# Patient Record
Sex: FEMALE | Race: BLACK OR AFRICAN AMERICAN | Employment: FULL TIME | URBAN - METROPOLITAN AREA
[De-identification: names, ages, dates, MRNs, and addresses within clinical notes are randomized per-mention and may not be internally consistent; named-entity substitution may affect disease eponyms.]

---

## 2018-07-19 ENCOUNTER — OFFICE VISIT (OUTPATIENT)
Dept: FAMILY MEDICINE CLINIC | Age: 28
End: 2018-07-19

## 2018-07-19 VITALS
DIASTOLIC BLOOD PRESSURE: 75 MMHG | HEART RATE: 87 BPM | WEIGHT: 221 LBS | BODY MASS INDEX: 40.67 KG/M2 | HEIGHT: 62 IN | TEMPERATURE: 98 F | RESPIRATION RATE: 20 BRPM | SYSTOLIC BLOOD PRESSURE: 131 MMHG

## 2018-07-19 DIAGNOSIS — E66.01 OBESITY, MORBID (HCC): ICD-10-CM

## 2018-07-19 DIAGNOSIS — R06.09 DYSPNEA ON EXERTION: ICD-10-CM

## 2018-07-19 DIAGNOSIS — Z11.3 ROUTINE SCREENING FOR STI (SEXUALLY TRANSMITTED INFECTION): ICD-10-CM

## 2018-07-19 DIAGNOSIS — Z76.89 ENCOUNTER TO ESTABLISH CARE: Primary | ICD-10-CM

## 2018-07-19 DIAGNOSIS — F41.8 SITUATIONAL ANXIETY: ICD-10-CM

## 2018-07-19 NOTE — PROGRESS NOTES
Family Medicine Initial Office Visit  Patient: Niraj Staley  1990, 24 y.o., female  Encounter Date: 7/19/2018    ASSESSMENT & PLAN    ICD-10-CM ICD-9-CM    1. Encounter to establish care Z76.89 V65.8    2. Routine screening for STI (sexually transmitted infection) Z11.3 V74.5     will check routine screening, discussed with patient   3. Obesity, morbid ([address] E66.01 278.01    4. Dyspnea on exertion R06.09 786.09    5. Situational anxiety F41.8 300.09      Orders Placed This Encounter    TRINESSA, 28, 0.18/0.215/0.25 mg-35 mcg (28) tab     Refill:  0   Obesity, morbid ([address]  Encouraged on diet, exercise and healthy lifestyle. Recommend patient set up her phone to help her track her steps. Can use HEALTH johana on Apple phones. Can also download apps to track food intake like My Fitness Pal or the like and can use apps to track other fitness such as RunTracker or similar. Encouraged starting a fun exercise habit like dance class or spinning which I believe the patient would enjoy, discussed accountability tools. Did speak to patient about higher risk level for adult onset conditions due to weight, she is aware and looking to make positive changes. Dyspnea on exertion  Does have PVC with exam today--EKG in office, consider echocardiogram. GUPTA could certainly be from deconditioning or anxiety but will rule out cardiac causes. Will also check labs today to establish a baseline    Situational anxiety  Patient encouraged to continue to follow with her therapist. No desire for medication at this time. Exercise can release \"feel good hormones\" called endorphins that can help with mood. Discussed that grief is an ongoing process to work through and can precipitate anxiety as well, which may complicate her situation. Sleep hygiene, adequate support and asking for help when needed. If symptoms change or worsen should call for follow up appt.  Again, we will check labs to ensure no underlying endocrinologic or metabolic abnormality which might be contributing. STI Testing  Routine, as per orders    CHIEF COMPLAINT  Chief Complaint   Patient presents with    Establish Care     was told she has a heart murmur        SUBJECTIVE  Papa Rodríguez is a 24 y.o. female presenting today for establishing care. She had a virus in the past and [facility] and she was told she had an irregular heart beat with a heart murmur. Sometimes gets SOB when she is increasing her activity. She also reports intermittent exertional chest pain with exertion. Pt reports increased stress an anxiety which started when she was in college, she finds that her father passing also causing some stress. He passed on April 1--she had been going to see a counselor for about a year now--that helps her tremendously. Exercise is a work in progress, she does find comfort in eating. Patient works as  at [employer]. Patient lives in a house with mother and brother  Patient was last seen by primary care 4 years ago. Sees gynecology at [facility]--does not remember when last pap was  Patient last saw a dentist a few years ago  Patient last had eye exam within the last year, wears glasses and contacts. Review of Systems   Constitutional: Negative for chills and fever. HENT: Negative. Eyes: Negative for visual disturbance. Respiratory: Negative for cough and shortness of breath. Cardiovascular: Negative for chest pain and leg swelling. Gastrointestinal: Negative for constipation, diarrhea, nausea and vomiting. Endocrine: Negative for polydipsia, polyphagia and polyuria. Genitourinary: Negative for difficulty urinating. Musculoskeletal: Negative for arthralgias and myalgias. Skin: Negative for pallor and rash. Neurological: Negative for seizures, syncope and headaches.    Psychiatric/Behavioral:        At Baseline, stable   All other systems reviewed and are negative. OBJECTIVE  Visit Vitals    /75    Pulse 87    Temp 98 °F (36.7 °C) (Oral)    Resp 20    Ht 5' 1.5\" (1.562 m)    Wt 221 lb (100.2 kg)    LMP 07/12/2018    BMI 41.08 kg/m2       Physical Exam   Constitutional: She is oriented to person, place, and time. She appears well-developed and well-nourished. No distress. NAD, Nontoxic, Appears Stated Age, morbidly obese   HENT:   Head: Normocephalic and atraumatic. Mouth/Throat: Oropharynx is clear and moist.   Eyes: Conjunctivae and EOM are normal. Right eye exhibits no discharge. Left eye exhibits no discharge. No scleral icterus. Neck: Neck supple. No thyromegaly present. Cardiovascular: Normal rate, regular rhythm, normal heart sounds and intact distal pulses. Exam reveals no gallop and no friction rub. No murmur heard. Regular underlying heart rhythm with pvcs, no murmur today   Pulmonary/Chest: Effort normal and breath sounds normal. No stridor. No respiratory distress. She has no wheezes. She has no rales. Abdominal: Soft. Bowel sounds are normal. She exhibits no distension. There is no tenderness. There is no rebound and no guarding. Musculoskeletal: She exhibits no edema or tenderness. Neurological: She is alert and oriented to person, place, and time. No cranial nerve deficit. She exhibits normal muscle tone. Grossly intact CN   Skin: Skin is warm and dry. No rash noted. She is not diaphoretic. Psychiatric: She has a normal mood and affect. Her behavior is normal.   Nursing note and vitals reviewed. No results found for any visits on 07/19/18. EKG: normal EKG, normal sinus rhythm, no previous for comparison. See scanned EKG in chart, performed in office today    HISTORICAL  Reviewed and updated today, and as noted below:    History reviewed. No pertinent past medical history. History reviewed. No pertinent surgical history.   Family History   Problem Relation Age of Onset    Hypertension Mother    [facility] Diabetes Father      History   Smoking Status    Never Smoker   Smokeless Tobacco    Never Used     Social History     Social History    Marital status: SINGLE     Spouse name: N/A    Number of children: N/A    Years of education: N/A     Social History Main Topics    Smoking status: Never Smoker    Smokeless tobacco: Never Used    Alcohol use No    Drug use: No    Sexual activity: Yes     Partners: Male     Other Topics Concern    None     Social History Narrative     No Known Allergies    No visits with results within 3 Month(s) from this visit. Latest known visit with results is:    Office Visit on 02/28/2014   Component Date Value Ref Range Status    Color (UA POC) 02/28/2014 Yellow   Final    Clarity (UA POC) 02/28/2014 Clear   Final    Glucose (UA POC) 02/28/2014 Negative  Negative Final    Bilirubin (UA POC) 02/28/2014 Negative  Negative Final    Ketones (UA POC) 02/28/2014 Negative  Negative Final    Specific gravity (UA POC) 02/28/2014 1.030  1.001 - 1.035 Final    Blood (UA POC) 02/28/2014   Negative Final    large     pH (UA POC) 02/28/2014 5.0  4.6 - 8.0 Final    Protein (UA POC) 02/28/2014 Trace  Negative mg/dL Final    Urobilinogen (UA POC) 02/28/2014 0.2 mg/dL  0.2 - 1 Final    Nitrites (UA POC) 02/28/2014 Positive  Negative Final    Leukocyte esterase (UA POC) 02/28/2014   Negative Final    small    Urine Culture, Routine 02/28/2014    Final                    Value:Escherichia coli                          Identified by an automated biochemical system. Greater than 100,000 colony forming units per mL    Urine Culture, Routine 02/28/2014    Final                    Value:Mixed urogenital suellen                          Less than 10,000 colonies/mL         Apryl Garvin MD  [facility]  07/19/18 9:17 AM    Portions of this note may have been populated using smart dictation software and may have \"sounds-like\" errors present.

## 2018-07-19 NOTE — ASSESSMENT & PLAN NOTE
Does have PVC with exam today--EKG in office, consider echocardiogram. GUPTA could certainly be from deconditioning or anxiety but will rule out cardiac causes.  Will also check labs today to establish a baseline

## 2018-07-19 NOTE — MR AVS SNAPSHOT
[address] 
[phone] Patient: Humaira Rico MRN: [id number] :1990 Visit Information Date & Time Provider Department Dept. Phone Encounter #  
 2018  [address] [id number] Upcoming Health Maintenance Date Due  
 HPV Age 9Y-34Y (1 of 3 - Female 3 Dose Series) 2003 PAP AKA CERVICAL CYTOLOGY 2013 Influenza Age 5 to Adult 2018 DTaP/Tdap/Td series (7 - Td) 2023 Allergies as of 2018  Review Complete On: 2018 By: Kayden Varghese No Known Allergies Current Immunizations  Reviewed on 2013 Name Date DTaP 1996, 10/12/1993, 3/25/1993, 1992, 1992 Hep B Vaccine 1999, 1997 Hep B Vaccine (Adult) 2013  8:54 AM  
 Hib 10/12/1993 MMR 1996, 10/12/1993 Meningococcal (MCV4P) Vaccine 2013 10:00 AM  
 Poliovirus vaccine 1997, 1996, 3/25/1993, 1992 Tdap 2013  9:58 AM  
  
 Not reviewed this visit You Were Diagnosed With   
  
 Codes Comments Encounter to establish care    -  Primary ICD-10-CM: Z76.89 
ICD-9-CM: V65.8 Routine screening for STI (sexually transmitted infection)     ICD-10-CM: Z11.3 ICD-9-CM: V74.5 will check routine screening, discussed with patient Obesity, morbid ([address]     ICD-10-CM: E66.01 
ICD-9-CM: 278.01 Dyspnea on exertion     ICD-10-CM: R06.09 
ICD-9-CM: 786.09 Situational anxiety     ICD-10-CM: F41.8 ICD-9-CM: 300.09 Vitals BP Pulse Temp Resp Height(growth percentile) Weight(growth percentile) 131/75 87 98 °F (36.7 °C) (Oral) 20 5' 1.5\" (1.562 m) 221 lb (100.2 kg) LMP BMI OB Status Smoking Status 2018 41.08 kg/m2 Having regular periods Never Smoker Vitals History BMI and BSA Data Body Mass Index Body Surface Area  41.08 kg/m 2 2.09 m 2  
  
 Preferred Pharmacy Pharmacy Name Phone [id number] [address] [phone] Your Updated Medication List  
  
   
This list is accurate as of 7/19/18 10:12 AM.  Always use your most recent med list.  
  
  
  
  
 BENADRYL ALLERGY PO Take  by mouth as needed. TRINESSA (28) 0.18/0.215/0.25 mg-35 mcg (28) Tab Generic drug:  norgestimate-ethinyl estradiol We Performed the Following AMB POC EKG ROUTINE W/ 12 LEADS, INTER & REP [61385 CPT(R)] CBC W/O DIFF [69873 CPT(R)] CHLAMYDIA/GC PCR [00378 CPT(R)] HIV 1/2 AG/AB, 4TH GENERATION,W RFLX CONFIRM C4657746 CPT(R)] LIPID PANEL [66517 CPT(R)] METABOLIC PANEL, COMPREHENSIVE [89018 CPT(R)] RPR W/REFLEX TITER AND CONFIRMATION [[id number] Custom] T4, FREE [id number] CPT(R)] TSH 3RD GENERATION [92693 CPT(R)] Introducing [facility]! Tate Del Angel introduces [facility] patient portal. Now you can access parts of your medical record, email your doctor's office, and request medication refills online. 1. In your internet browser, go to [email or web address] 2. Click on the First Time User? Click Here link in the Sign In box. You will see the New Member Sign Up page. 3. Enter your [facility] Access Code exactly as it appears below. You will not need to use this code after youve completed the sign-up process. If you do not sign up before the expiration date, you must request a new code. · [facility] Access Code: [email or web address] Expires: 10/17/2018  8:48 AM 
 
4. Enter the last four digits of your Social Security Number (xxxx) and Date of Birth (mm/dd/yyyy) as indicated and click Submit. You will be taken to the next sign-up page. 5. Create a Piazzat ID. This will be your [facility] login ID and cannot be changed, so think of one that is secure and easy to remember. 6. Create a [facility] password. You can change your password at any time. 7. Enter your Password Reset Question and Answer. This can be used at a later time if you forget your password. 8. Enter your e-mail address. You will receive e-mail notification when new information is available in [address] 9. Click Sign Up. You can now view and download portions of your medical record. 10. Click the Download Summary menu link to download a portable copy of your medical information. If you have questions, please visit the Frequently Asked Questions section of the Datezr website. Remember, Datezr is NOT to be used for urgent needs. For medical emergencies, dial 911. Now available from your iPhone and Android! Please provide this summary of care documentation to your next provider. Your primary care clinician is listed as Catalina Camilo. If you have any questions after today's visit, please call [phone].

## 2018-07-19 NOTE — ASSESSMENT & PLAN NOTE
Patient encouraged to continue to follow with her therapist. No desire for medication at this time. Exercise can release \"feel good hormones\" called endorphins that can help with mood. Discussed that grief is an ongoing process to work through and can precipitate anxiety as well, which may complicate her situation. Sleep hygiene, adequate support and asking for help when needed. If symptoms change or worsen should call for follow up appt. Again, we will check labs to ensure no underlying endocrinologic or metabolic abnormality which might be contributing.

## 2018-07-19 NOTE — ASSESSMENT & PLAN NOTE
Encouraged on diet, exercise and healthy lifestyle. Recommend patient set up her phone to help her track her steps. Can use HEALTH johana on Apple phones. Can also download apps to track food intake like My Fitness Pal or the like and can use apps to track other fitness such as RunTracker or similar. Encouraged starting a fun exercise habit like dance class or spinning which I believe the patient would enjoy, discussed accountability tools. Did speak to patient about higher risk level for adult onset conditions due to weight, she is aware and looking to make positive changes.

## 2018-07-19 NOTE — PATIENT INSTRUCTIONS
Obesity, morbid ([facility] [address]  Encouraged on diet, exercise and healthy lifestyle. Recommend patient set up her phone to help her track her steps. Can use HEALTH johana on Apple phones. Can also download apps to track food intake like My Fitness Pal or the like and can use apps to track other fitness such as RunTracker or similar. Encouraged starting a fun exercise habit like dance class or spinning which I believe the patient would enjoy, discussed accountability tools. Did speak to patient about higher risk level for adult onset conditions due to weight, she is aware and looking to make positive changes. Dyspnea on exertion  Does have PVC with exam today--EKG in office, consider echocardiogram. GUPTA could certainly be from deconditioning or anxiety but will rule out cardiac causes. Will also check labs today to establish a baseline    Situational anxiety  Patient encouraged to continue to follow with her therapist. No desire for medication at this time. Exercise can release \"feel good hormones\" called endorphins that can help with mood. Discussed that grief is an ongoing process to work through and can precipitate anxiety as well, which may complicate her situation. Sleep hygiene, adequate support and asking for help when needed. If symptoms change or worsen should call for follow up appt. Again, we will check labs to ensure no underlying endocrinologic or metabolic abnormality which might be contributing.

## 2018-07-20 ENCOUNTER — TELEPHONE (OUTPATIENT)
Dept: FAMILY MEDICINE CLINIC | Age: 28
End: 2018-07-20

## 2018-07-20 NOTE — TELEPHONE ENCOUNTER
----- Message from Brittany Perkins MD sent at 7/19/2018 10:50 AM EDT -----  Regarding: pt records req  Pt has been seeing [address] [facility] on [place], please can we request record of PAP? [facility] [place], was told about heart murmur, please can we request record? Thank you!   CKD

## 2018-07-20 NOTE — TELEPHONE ENCOUNTER
Request for PAP results have been faxed to [facility] at [phone]. Request for heart murmur records have been faxed to [facility] at [phone].

## 2020-11-06 ENCOUNTER — OFFICE VISIT (OUTPATIENT)
Dept: FAMILY MEDICINE CLINIC | Age: 30
End: 2020-11-06
Payer: COMMERCIAL

## 2020-11-06 VITALS
DIASTOLIC BLOOD PRESSURE: 79 MMHG | WEIGHT: 189 LBS | HEART RATE: 81 BPM | SYSTOLIC BLOOD PRESSURE: 113 MMHG | RESPIRATION RATE: 20 BRPM | BODY MASS INDEX: 34.78 KG/M2 | OXYGEN SATURATION: 97 % | TEMPERATURE: 98.2 F | HEIGHT: 62 IN

## 2020-11-06 DIAGNOSIS — K62.89 RECTAL PAIN: Primary | ICD-10-CM

## 2020-11-06 PROCEDURE — 99213 OFFICE O/P EST LOW 20 MIN: CPT | Performed by: FAMILY MEDICINE

## 2020-11-06 PROCEDURE — 82274 ASSAY TEST FOR BLOOD FECAL: CPT | Performed by: FAMILY MEDICINE

## 2020-11-06 NOTE — PROGRESS NOTES
Chief Complaint   Patient presents with    Hemorrhoids     pt states she has had for years- OTC medications not helping

## 2020-11-06 NOTE — PROGRESS NOTES
Family Medicine Acute Visit Progress Note  Patient: Andreina Melton  1990, 31 y.o., female  Encounter Date: 11/6/2020    ASSESSMENT & PLAN    ICD-10-CM ICD-9-CM    1. Rectal pain  K62.89 569.42 REFERRAL TO GASTROENTEROLOGY      AMB POC BLOOD OCCULT QUAL FECAL HEMGLBN 1-3       Orders Placed This Encounter    Aminah Sung [facility]     Referral Priority:   Routine     Referral Type:   Consultation     Referral Reason:   Specialty Services Required     Referral Location:   [facility]     Referred to Provider:   Carmel Hodgson MD     Number of Visits Requested:   1    AMB POC BLOOD OCCULT QUAL FECAL HEMGLBN 1-3       Patient Instructions   The patient appears to have a very small external hemorrhoid at 11:00 that is not thrombosed or inflamed and she does have some rectal pain when I do the internal exam however there is no palpable mass and the FOBT was negative.   I would recommend she continue the hydrocortisone suppository she is using and follow-up with gastroenterology if her symptoms continue to worsen or do not improve with the course of hydrocortisone suppositories that she is already using  I discussed with her the goal of 30 g of fiber daily and potentially logging her fiber so that she can make sure that she stays on top of this as well as drinking plenty of water      CHIEF COMPLAINT  Chief Complaint   Patient presents with    Hemorrhoids     pt states she has had for years- OTC medications not helping        SUBJECTIVE  Andreina Melton is a 31 y.o. female presenting today for hemorrhoids  In July she had a large external hemorrhoid  Now having what she thinks is internal hemorrhoids that are somewhat becoming more painful and she's wanting to get it better  Right now trying to eat more fiber  Trying to drink more water  Trying to exercise  Trying to use the bathroom when she has to, \"not holding it\"  Yes to bleeding, bright red it is in the toilet bowl and on the tb, there are some streaks on the bowel movement as well    [place] stool scale is around 6 she reports, it \"Shreds\" when she flushes the toilet    Has lost 14 lbs since her last visit here  Is a pescatarian now x 1 year  Review of Systems  A 12 point review of systems was negative except as noted here or in the HPI. OBJECTIVE  Visit Vitals  /79   Pulse 81   Temp 98.2 °F (36.8 °C) (Temporal)   Resp 20   Ht 5' 1.5\" (1.562 m)   Wt 189 lb (85.7 kg)   SpO2 97%   BMI 35.13 kg/m²       Physical Exam  Constitutional:       General: She is not in acute distress. Appearance: Normal appearance. She is not ill-appearing, toxic-appearing or diaphoretic. HENT:      Head: Normocephalic and atraumatic. Right Ear: External ear normal.      Left Ear: External ear normal.   Eyes:      General: No scleral icterus. Right eye: No discharge. Left eye: No discharge. Comments: eom grossly intact   Neck:      Comments: No visible neck masses , ROM appears normal from visual inspection  Pulmonary:      Effort: Pulmonary effort is normal. No respiratory distress. Genitourinary:     Comments: Small external hemorrhoid at 11:00 without thrombosis and without inflammation, not exquisitely tender on physical exam however internal exam was tender, sphincter tone was good and normal and there were no palpable masses, stool in the vault was brown and FOBT was negative  Skin:     Comments: Visible skin is without jaundice, bruising, lesion, pallor, erythema or rash except as otherwise noted   Neurological:      General: No focal deficit present. Mental Status: She is alert and oriented to person, place, and time. Psychiatric:         Mood and Affect: Mood normal.         Behavior: Behavior normal.         Thought Content:  Thought content normal.         Judgment: Judgment normal.         Results for orders placed or performed in visit on 11/06/20   AMB POC BLOOD OCCULT QUAL FECAL HEMGLBN 1-3    Narrative    Neg, control was noted and positive as normal       HISTORICAL  PMH, PSH, FHX, SOCHX, ALLERGIES and MEDS were reviewed and updated today. Dell David MD  [facility]  11/06/20 11:14 AM    Portions of this note may have been populated using smart dictation software and may have \"sounds-like\" errors present. Pt was counseled on risks, benefits and alternatives of treatment options. All questions were asked and answered and the patient was agreeable with the treatment plan as outlined.

## 2020-11-06 NOTE — PATIENT INSTRUCTIONS
The patient appears to have a very small external hemorrhoid at 11:00 that is not thrombosed or inflamed and she does have some rectal pain when I do the internal exam however there is no palpable mass and the FOBT was negative.   I would recommend she continue the hydrocortisone suppository she is using and follow-up with gastroenterology if her symptoms continue to worsen or do not improve with the course of hydrocortisone suppositories that she is already using  I discussed with her the goal of 30 g of fiber daily and potentially logging her fiber so that she can make sure that she stays on top of this as well as drinking plenty of water

## 2021-04-21 LAB — SARS-COV-2, NAA: NOT DETECTED

## 2021-09-13 LAB — SARS-COV-2: NOT DETECTED

## 2022-03-16 LAB — SARS-COV-2, NAA: NOT DETECTED

## 2022-03-19 PROBLEM — E66.01 OBESITY, MORBID (HCC): Status: ACTIVE | Noted: 2018-07-19

## 2022-03-19 PROBLEM — F41.8 SITUATIONAL ANXIETY: Status: ACTIVE | Noted: 2018-07-19

## 2022-03-19 PROBLEM — R06.09 DYSPNEA ON EXERTION: Status: ACTIVE | Noted: 2018-07-19

## 2022-09-28 ENCOUNTER — APPOINTMENT (OUTPATIENT)
Dept: GENERAL RADIOLOGY | Age: 32
End: 2022-09-28
Attending: EMERGENCY MEDICINE
Payer: COMMERCIAL

## 2022-09-28 ENCOUNTER — HOSPITAL ENCOUNTER (EMERGENCY)
Age: 32
Discharge: HOME | End: 2022-09-28
Attending: EMERGENCY MEDICINE
Payer: COMMERCIAL

## 2022-09-28 VITALS
SYSTOLIC BLOOD PRESSURE: 126 MMHG | HEART RATE: 65 BPM | RESPIRATION RATE: 16 BRPM | WEIGHT: 221 LBS | DIASTOLIC BLOOD PRESSURE: 79 MMHG | OXYGEN SATURATION: 98 % | BODY MASS INDEX: 43.39 KG/M2 | HEIGHT: 60 IN | TEMPERATURE: 98.7 F

## 2022-09-28 DIAGNOSIS — R07.9 ACUTE CHEST PAIN: Primary | ICD-10-CM

## 2022-09-28 LAB
ALBUMIN SERPL-MCNC: 3.5 G/DL (ref 3.5–5)
ALBUMIN/GLOB SERPL: 0.8 {RATIO} (ref 1.1–2.2)
ALP SERPL-CCNC: 74 U/L (ref 45–117)
ALT SERPL-CCNC: 20 U/L (ref 12–78)
ANION GAP SERPL CALC-SCNC: 4 MMOL/L (ref 5–15)
AST SERPL-CCNC: 14 U/L (ref 15–37)
BASOPHILS # BLD: 0.1 K/UL (ref 0–0.1)
BASOPHILS NFR BLD: 1 % (ref 0–1)
BILIRUB SERPL-MCNC: 0.3 MG/DL (ref 0.2–1)
BUN SERPL-MCNC: 8 MG/DL (ref 6–20)
BUN/CREAT SERPL: 11 (ref 12–20)
CALCIUM SERPL-MCNC: 9.4 MG/DL (ref 8.5–10.1)
CHLORIDE SERPL-SCNC: 105 MMOL/L (ref 97–108)
CO2 SERPL-SCNC: 29 MMOL/L (ref 21–32)
COMMENT, HOLDF: NORMAL
CREAT SERPL-MCNC: 0.75 MG/DL (ref 0.55–1.02)
DIFFERENTIAL METHOD BLD: ABNORMAL
EOSINOPHIL # BLD: 0.6 K/UL (ref 0–0.4)
EOSINOPHIL NFR BLD: 10 % (ref 0–7)
ERYTHROCYTE [DISTWIDTH] IN BLOOD BY AUTOMATED COUNT: 12 % (ref 11.5–14.5)
GLOBULIN SER CALC-MCNC: 4.3 G/DL (ref 2–4)
GLUCOSE SERPL-MCNC: 91 MG/DL (ref 65–100)
HCT VFR BLD AUTO: 39.2 % (ref 35–47)
HGB BLD-MCNC: 12.8 G/DL (ref 11.5–16)
IMM GRANULOCYTES # BLD AUTO: 0 K/UL (ref 0–0.04)
IMM GRANULOCYTES NFR BLD AUTO: 0 % (ref 0–0.5)
LYMPHOCYTES # BLD: 3 K/UL (ref 0.8–3.5)
LYMPHOCYTES NFR BLD: 44 % (ref 12–49)
MCH RBC QN AUTO: 30.3 PG (ref 26–34)
MCHC RBC AUTO-ENTMCNC: 32.7 G/DL (ref 30–36.5)
MCV RBC AUTO: 92.9 FL (ref 80–99)
MONOCYTES # BLD: 0.5 K/UL (ref 0–1)
MONOCYTES NFR BLD: 7 % (ref 5–13)
NEUTS SEG # BLD: 2.6 K/UL (ref 1.8–8)
NEUTS SEG NFR BLD: 38 % (ref 32–75)
NRBC # BLD: 0 K/UL (ref 0–0.01)
NRBC BLD-RTO: 0 PER 100 WBC
PLATELET # BLD AUTO: 359 K/UL (ref 150–400)
PMV BLD AUTO: 10.8 FL (ref 8.9–12.9)
POTASSIUM SERPL-SCNC: 3.8 MMOL/L (ref 3.5–5.1)
PROT SERPL-MCNC: 7.8 G/DL (ref 6.4–8.2)
RBC # BLD AUTO: 4.22 M/UL (ref 3.8–5.2)
SAMPLES BEING HELD,HOLD: NORMAL
SODIUM SERPL-SCNC: 138 MMOL/L (ref 136–145)
TROPONIN-HIGH SENSITIVITY: 4 NG/L (ref 0–51)
WBC # BLD AUTO: 6.7 K/UL (ref 3.6–11)

## 2022-09-28 PROCEDURE — 99284 EMERGENCY DEPT VISIT MOD MDM: CPT

## 2022-09-28 PROCEDURE — 84484 ASSAY OF TROPONIN QUANT: CPT

## 2022-09-28 PROCEDURE — 80053 COMPREHEN METABOLIC PANEL: CPT

## 2022-09-28 PROCEDURE — 71046 X-RAY EXAM CHEST 2 VIEWS: CPT

## 2022-09-28 PROCEDURE — 85025 COMPLETE CBC W/AUTO DIFF WBC: CPT

## 2022-09-28 NOTE — ED PROVIDER NOTES
Healthy. She presents with complaints of chest pain. It began approximately 4 hours ago. It lasted about 2 hours. It was constant during that time. She describes it as a sharp stabbing pain right about the level of her xiphoid process. It radiates through to her back. It is since resolved. She has had similar but shorter acting pain in the past.  While the pain was present, it felt better to set up rather than leaning forward or lay back. No dyspnea. She had some nausea that has resolved. She denies a history of hypertension, diabetes, hyperlipidemia, cigarette smoking, or any known family history (first-degree relatives) of coronary disease. No past medical history on file. No past surgical history on file. Family History:   Problem Relation Age of Onset    Hypertension Mother     Diabetes Father        Social History     Socioeconomic History    Marital status: SINGLE     Spouse name: Not on file    Number of children: Not on file    Years of education: Not on file    Highest education level: Not on file   Occupational History    Not on file   Tobacco Use    Smoking status: Never    Smokeless tobacco: Never   Substance and Sexual Activity    Alcohol use: No    Drug use: No    Sexual activity: Yes     Partners: Male   Other Topics Concern    Not on file   Social History Narrative    Not on file     Social Determinants of Health     Financial Resource Strain: Not on file   Food Insecurity: Not on file   Transportation Needs: Not on file   Physical Activity: Not on file   Stress: Not on file   Social Connections: Not on file   Intimate Partner Violence: Not on file   Housing Stability: Not on file         ALLERGIES: Patient has no known allergies. Review of Systems   All other systems reviewed and are negative.     Vitals:    09/28/22 1352   BP: 126/79   Pulse: 65   Resp: 16   Temp: 98.7 °F (37.1 °C)   SpO2: 98%   Weight: 100.2 kg (221 lb)   Height: 5' (1.524 m)            Physical Exam  Vitals and nursing note reviewed. Constitutional:       Appearance: She is well-developed. HENT:      Head: Normocephalic and atraumatic. Eyes:      Conjunctiva/sclera: Conjunctivae normal.   Neck:      Trachea: No tracheal deviation. Cardiovascular:      Rate and Rhythm: Normal rate and regular rhythm. Heart sounds: Normal heart sounds. No murmur heard. No friction rub. No gallop. Pulmonary:      Effort: Pulmonary effort is normal.      Breath sounds: Normal breath sounds. Abdominal:      Palpations: Abdomen is soft. Tenderness: There is no abdominal tenderness. Musculoskeletal:         General: No deformity. Cervical back: Neck supple. Skin:     General: Skin is warm and dry. Neurological:      Mental Status: She is alert. Comments: oriented        MDM         Procedures    EKG: Normal sinus rhythm; rate of 64; normal ST, Mohan Escobedo MD  1:45 PM    Progress Note:  Results, treatment, and follow up plan have been discussed with patient. Questions were answered. Hannah Trivedi MD  4:06 PM    Assessment/plan: Chest pain -differential diagnosis includes ACS, PE, aortic dissection, musculoskeletal pain, GERD, and others. Reassuring appearance/exam with stable vital signs. CBC, CMP, EKG, troponin, chest x-ray okay. Home with PCP follow-up. Return precautions.   Hannah Trivedi MD  4:06 PM

## 2022-09-28 NOTE — ED TRIAGE NOTES
Pt arriving to ED via EMS from work c/o pressure chest pain that radiates to the back for the last hour that has been intermittent for the \"last few months. \" Pt reports that the pain has been going away since EMS arrived to her work to evaluate her.  Pt is a&ox4 and 98% on RA

## 2023-05-16 RX ORDER — NORGESTIMATE AND ETHINYL ESTRADIOL 7DAYSX3 28
KIT ORAL
COMMUNITY
Start: 2018-07-12

## 2024-06-13 ENCOUNTER — OFFICE VISIT (OUTPATIENT)
Age: 34
End: 2024-06-13

## 2024-06-13 VITALS
HEIGHT: 61 IN | OXYGEN SATURATION: 99 % | WEIGHT: 215 LBS | RESPIRATION RATE: 18 BRPM | BODY MASS INDEX: 40.59 KG/M2 | HEART RATE: 88 BPM | TEMPERATURE: 98.7 F

## 2024-06-13 DIAGNOSIS — R10.11 ABDOMINAL PAIN, RUQ: Primary | ICD-10-CM

## 2024-06-13 DIAGNOSIS — R10.2 SUPRAPUBIC PAIN: ICD-10-CM

## 2024-06-13 ASSESSMENT — ENCOUNTER SYMPTOMS: ABDOMINAL PAIN: 1

## 2024-06-13 NOTE — PROGRESS NOTES
Comments: Palpation of RUQ causes radiating pain in LUQ.   Skin:     General: Skin is warm and dry.   Neurological:      General: No focal deficit present.      Mental Status: She is alert and oriented to person, place, and time.   Psychiatric:         Mood and Affect: Mood normal.         Behavior: Behavior normal.         Thought Content: Thought content normal.         Judgment: Judgment normal.          ASSESSMENT/PLAN:  1. Abdominal pain, RUQ  -     US ABDOMEN COMPLETE; Future  -     CBC with Auto Differential; Future  -     Comprehensive Metabolic Panel; Future  -     Lipase; Future  2. Suprapubic pain        RUQ abdominal pain -  Patient well-appearing, afebrile  Cholecystitis vs cholelithiasis vs viral gastroenteritis  Labs drawn today, we will call with results  Wanted to check UA and pregnancy in clinic but patient declined, could not go at that time and wanted to leave  Recommend abdominal ultrasound given script  Gentle food diet (BRAT diet)  Drink plenty fluids  If any worsening recommend you go to the emergency room  Any other concern please call or return to clinic  Patient comfortable with plan       An electronic signature was used to authenticate this note.    Shanelle Rashid PA-C

## 2024-06-13 NOTE — PATIENT INSTRUCTIONS
RUQ abdominal pain -  Labs drawn today, we will call with results  Recommend abdominal ultrasound, given script and information to have done at outpatient imaging  Gentle food diet (BRAT diet)  Drink plenty fluids  If any worsening recommend you go to the emergency room  Any other concern please call or return to clinic

## 2024-06-15 LAB
ALBUMIN SERPL-MCNC: 4.2 G/DL (ref 3.9–4.9)
ALBUMIN/GLOB SERPL: 1.3 {RATIO}
ALP SERPL-CCNC: 79 IU/L (ref 44–121)
ALT SERPL-CCNC: 11 IU/L (ref 0–32)
AST SERPL-CCNC: 16 IU/L (ref 0–40)
BASOPHILS # BLD AUTO: 0.1 X10E3/UL (ref 0–0.2)
BASOPHILS NFR BLD AUTO: 1 %
BILIRUB SERPL-MCNC: 0.4 MG/DL (ref 0–1.2)
BUN SERPL-MCNC: 6 MG/DL (ref 6–20)
BUN/CREAT SERPL: 9 (ref 9–23)
CALCIUM SERPL-MCNC: 9.4 MG/DL (ref 8.7–10.2)
CHLORIDE SERPL-SCNC: 99 MMOL/L (ref 96–106)
CO2 SERPL-SCNC: 23 MMOL/L (ref 20–29)
CREAT SERPL-MCNC: 0.66 MG/DL (ref 0.57–1)
EGFRCR SERPLBLD CKD-EPI 2021: 120 ML/MIN/1.73
EOSINOPHIL NFR BLD AUTO: 3 %
ERYTHROCYTE [DISTWIDTH] IN BLOOD BY AUTOMATED COUNT: 11.4 % (ref 11.7–15.4)
GLOBULIN SER CALC-MCNC: 3.3 G/DL (ref 1.5–4.5)
GLUCOSE SERPL-MCNC: 90 MG/DL (ref 70–99)
HCT VFR BLD AUTO: 41.6 % (ref 34–46.6)
HGB BLD-MCNC: 13.4 G/DL (ref 11.1–15.9)
IMM GRANULOCYTES # BLD AUTO: 0 X10E3/UL (ref 0–0.1)
IMM GRANULOCYTES NFR BLD AUTO: 0 %
LIPASE SERPL-CCNC: 26 U/L (ref 14–72)
LYMPHOCYTES # BLD AUTO: 1.2 X10E3/UL (ref 0.7–3.1)
LYMPHOCYTES NFR BLD AUTO: 16 %
MCH RBC QN AUTO: 30.7 PG (ref 26.6–33)
MCHC RBC AUTO-ENTMCNC: 32.2 G/DL (ref 31.5–35.7)
MCV RBC AUTO: 95 FL (ref 79–97)
MONOCYTES # BLD AUTO: 0.4 X10E3/UL (ref 0.1–0.9)
MONOCYTES NFR BLD AUTO: 6 %
NEUTROPHILS # BLD AUTO: 5.6 X10E3/UL (ref 1.4–7)
NEUTROPHILS NFR BLD AUTO: 74 %
PLATELET # BLD AUTO: 338 X10E3/UL (ref 150–450)
POTASSIUM SERPL-SCNC: 4.2 MMOL/L (ref 3.5–5.2)
PROT SERPL-MCNC: 7.5 G/DL (ref 6–8.5)
RBC # BLD AUTO: 4.36 X10E6/UL (ref 3.77–5.28)
SODIUM SERPL-SCNC: 136 MMOL/L (ref 134–144)
WBC # BLD AUTO: 7.6 X10E3/UL (ref 3.4–10.8)

## 2024-08-20 ENCOUNTER — TELEPHONE (OUTPATIENT)
Facility: CLINIC | Age: 34
End: 2024-08-20

## 2024-08-20 NOTE — TELEPHONE ENCOUNTER
----- Message from Stephen REYES sent at 8/20/2024  3:23 PM EDT -----  Regarding: [facility] Appointment Request  [facility] Appointment Request    Patient needs appointment for [facility] Appointment Type: New to Provider.    Patient Requested Dates(s): As soon as possible  Patient Requested Time: Morning appointments   Provider Name: Either Dr. Chantale Miller MD or Dr. Kimber Berrios MD    Additional Information: Patient is requesting to have a New to Provider appointment and get established care with either Dr. Chantale Miller MD or Dr. Kimber Berrios MD as soon as possible and she prefers morning appointments but can be flexible depending on the Provider's availability.    Reason for Appointment Request: New Patient - No appointments available during search  --------------------------------------------------------------------------------------------------------------------------    Relationship to Patient: Self     Call Back Information: OK to leave message on voicemail  Preferred Call Back Number: Phone [phone]

## 2024-10-07 ENCOUNTER — OFFICE VISIT (OUTPATIENT)
Facility: CLINIC | Age: 34
End: 2024-10-07
Payer: COMMERCIAL

## 2024-10-07 VITALS
HEART RATE: 74 BPM | BODY MASS INDEX: 41.35 KG/M2 | OXYGEN SATURATION: 96 % | HEIGHT: 61 IN | DIASTOLIC BLOOD PRESSURE: 84 MMHG | RESPIRATION RATE: 16 BRPM | TEMPERATURE: 97.2 F | WEIGHT: 219 LBS | SYSTOLIC BLOOD PRESSURE: 126 MMHG

## 2024-10-07 DIAGNOSIS — E66.01 MORBID OBESITY WITH BMI OF 40.0-44.9, ADULT: ICD-10-CM

## 2024-10-07 DIAGNOSIS — Z76.89 ENCOUNTER TO ESTABLISH CARE: ICD-10-CM

## 2024-10-07 DIAGNOSIS — N64.4 BREAST PAIN: ICD-10-CM

## 2024-10-07 DIAGNOSIS — Z80.41 FAMILY HISTORY OF OVARIAN CANCER: ICD-10-CM

## 2024-10-07 DIAGNOSIS — N92.1 MENORRHAGIA WITH IRREGULAR CYCLE: ICD-10-CM

## 2024-10-07 DIAGNOSIS — Z76.89 ENCOUNTER TO ESTABLISH CARE: Primary | ICD-10-CM

## 2024-10-07 DIAGNOSIS — R10.13 DYSPEPSIA: ICD-10-CM

## 2024-10-07 PROBLEM — K21.9 ACID REFLUX: Status: ACTIVE | Noted: 2024-10-07

## 2024-10-07 PROBLEM — R06.09 DYSPNEA ON EXERTION: Status: RESOLVED | Noted: 2018-07-19 | Resolved: 2024-10-07

## 2024-10-07 PROCEDURE — 99395 PREV VISIT EST AGE 18-39: CPT

## 2024-10-07 RX ORDER — FAMOTIDINE 40 MG/1
40 TABLET, FILM COATED ORAL EVERY EVENING
Qty: 30 TABLET | Refills: 3 | Status: CANCELLED | OUTPATIENT
Start: 2024-10-07

## 2024-10-07 RX ORDER — FAMOTIDINE 20 MG/1
20 TABLET, FILM COATED ORAL 2 TIMES DAILY
Qty: 60 TABLET | Refills: 3 | Status: SHIPPED | OUTPATIENT
Start: 2024-10-07

## 2024-10-07 SDOH — HEALTH STABILITY: PHYSICAL HEALTH
ON AVERAGE, HOW MANY DAYS PER WEEK DO YOU ENGAGE IN MODERATE TO STRENUOUS EXERCISE (LIKE A BRISK WALK)?: PATIENT DECLINED

## 2024-10-07 SDOH — ECONOMIC STABILITY: FOOD INSECURITY: WITHIN THE PAST 12 MONTHS, YOU WORRIED THAT YOUR FOOD WOULD RUN OUT BEFORE YOU GOT MONEY TO BUY MORE.: NEVER TRUE

## 2024-10-07 SDOH — ECONOMIC STABILITY: FOOD INSECURITY: WITHIN THE PAST 12 MONTHS, THE FOOD YOU BOUGHT JUST DIDN'T LAST AND YOU DIDN'T HAVE MONEY TO GET MORE.: NEVER TRUE

## 2024-10-07 SDOH — ECONOMIC STABILITY: INCOME INSECURITY: HOW HARD IS IT FOR YOU TO PAY FOR THE VERY BASICS LIKE FOOD, HOUSING, MEDICAL CARE, AND HEATING?: NOT HARD AT ALL

## 2024-10-07 ASSESSMENT — PATIENT HEALTH QUESTIONNAIRE - PHQ9
SUM OF ALL RESPONSES TO PHQ QUESTIONS 1-9: 2
2. FEELING DOWN, DEPRESSED OR HOPELESS: SEVERAL DAYS
SUM OF ALL RESPONSES TO PHQ9 QUESTIONS 1 & 2: 2
1. LITTLE INTEREST OR PLEASURE IN DOING THINGS: SEVERAL DAYS

## 2024-10-07 ASSESSMENT — ENCOUNTER SYMPTOMS
ABDOMINAL PAIN: 0
CHEST TIGHTNESS: 0
WHEEZING: 0
SHORTNESS OF BREATH: 0

## 2024-10-07 NOTE — ASSESSMENT & PLAN NOTE
Patient reports low levels of physical activity and a high level of processed foods and sugar.  States she is trying to cut back.  Discussed with her relationship between insulin resistance as well as menorrhagia, irregular menstrual cycle, and endometrial hyperplasias.

## 2024-10-07 NOTE — ASSESSMENT & PLAN NOTE
Patient reports she is often having to use overnight pads in combination with tampons and is changing about 4 times daily.  Mom has a history of fibroids.  Patient denies any gynecological history of which she is aware.  She has nulliparous, no history of fibroids, ovarian cysts, or bleeding disorders that she is aware of.  Patient reports that recently she has noticed some bleeding in between menses which is new for her.

## 2024-10-07 NOTE — ASSESSMENT & PLAN NOTE
Patient states she often notices bloating and dyspepsia after consumption of bread.  Discussed with her keeping a food diary to narrow what may be causing gastric upset.  H2 agonist prescribed for treatment of dyspepsia.  She has been referred to GI in the past but has been unable to follow-up yet.  Patient denies diarrhea, mucus in her stool, or blood in her bowel movements.

## 2024-10-07 NOTE — PROGRESS NOTES
Chief Complaint   Patient presents with    New Patient    Annual Exam     Having Gastro issues past couple years only had surface level treatment, For most of September she has had a Menstrual cycle not heavy. Pain in both breast alternates.     PHQ-9 Total Score: 2 (10/7/2024  8:09 AM)    \"Have you been to the ER, urgent care clinic since your last visit?  Hospitalized since your last visit?\"    NO    “Have you seen or consulted any other health care providers outside our system since your last visit?”    NO     “Have you had a pap smear?”    NO    Date of last Cervical Cancer screen (HPV or PAP): 9/14/2017         Click Here for Release of Records Request     Pulse 74   Temp 97.2 °F (36.2 °C) (Temporal)   Resp 16   Ht 1.549 m (5' 1\")   Wt 99.3 kg (219 lb)   LMP 09/23/2024   SpO2 96%   BMI 41.38 kg/m²   \

## 2024-10-07 NOTE — ASSESSMENT & PLAN NOTE
Maternal grandma had ovarian cancer in her sixties, Paternal aunt has breast cancer in her forties. Patient has never had BRCA testing.

## 2024-10-07 NOTE — ASSESSMENT & PLAN NOTE
She reports intermittent pain b/l upper anterior breasts and back pain. Denies skin changes, lumps, or breast discharge.  She will notice tenderness increased around the time of her cycle.  Patient has larger cup size.  Discussed with her exercises that might strengthen her core and her back, as well as obtaining a more supportive bra.

## 2024-10-07 NOTE — PROGRESS NOTES
Annual Physical    Veronica is a 34 y.o. female who is on the schedule today for annual wellness exam and is also due for follow-up of chronic issues.     For the wellness:  Flu vaccine- Refuses at this time  COVID vaccine- Refuses at this time  Tetanus- Due 2032  HIV/HCV screening- Will do today  PAP/Hx HPV- Last PAP January 2024  LMP/OB/GynHx/Menopause S/S- LMP Sept 23, 2024- Sept 27. Earlier in the month she was spotting in between periods, this hasn't happened before. Mom has history of fibroids. Nulliparous.  Patient denies pelvic pain. Goes through 3-4 pads daily, she wears tampons and overnight pads.  Gardasil- Had in childhood  Mammogram/Family Hx of Breast or Ovarian CA- Maternal grandma had ovarian cancer in her sixties, Paternal aunt has breast cancer in her forties. Patient has never had BRCA testing.     Smoking status-   Tobacco Use      Smoking status: Never      Smokeless tobacco: Never     Exercise- Does not exercise  Diet- Lots of sugar  Dentist- This August        10/7/2024     8:09 AM   PHQ Scores   PHQ2 Score 2   PHQ9 Score 2       Interpretation of Total Score Depression Severity: 1-4 = Minimal depression, 5-9 = Mild depression, 10-14 = Moderate depression, 15-19 = Moderately severe depression, 20-27 = Severe depression     Review of Systems   Constitutional:  Negative for fever and unexpected weight change.   Respiratory:  Negative for chest tightness, shortness of breath and wheezing.    Cardiovascular:  Negative for chest pain, palpitations and leg swelling.   Gastrointestinal:  Negative for abdominal pain.   Genitourinary:  Negative for menstrual problem and pelvic pain.   Neurological:  Negative for dizziness, weakness and headaches.      Physical Exam  Constitutional:       Appearance: Normal appearance.   HENT:      Head: Normocephalic.   Cardiovascular:      Rate and Rhythm: Normal rate and regular rhythm.      Heart sounds: Normal heart sounds.   Pulmonary:      Effort: Pulmonary

## 2024-10-08 LAB
25(OH)D3+25(OH)D2 SERPL-MCNC: 15.1 NG/ML (ref 30–100)
ALBUMIN SERPL-MCNC: 4.2 G/DL (ref 3.9–4.9)
ALP SERPL-CCNC: 73 IU/L (ref 44–121)
ALT SERPL-CCNC: 14 IU/L (ref 0–32)
AST SERPL-CCNC: 19 IU/L (ref 0–40)
BASOPHILS # BLD AUTO: 0.1 X10E3/UL (ref 0–0.2)
BASOPHILS NFR BLD AUTO: 1 %
BILIRUB SERPL-MCNC: 0.4 MG/DL (ref 0–1.2)
BUN SERPL-MCNC: 6 MG/DL (ref 6–20)
BUN/CREAT SERPL: 9 (ref 9–23)
CALCIUM SERPL-MCNC: 9.3 MG/DL (ref 8.7–10.2)
CHLORIDE SERPL-SCNC: 104 MMOL/L (ref 96–106)
CHOLEST SERPL-MCNC: 228 MG/DL (ref 100–199)
CO2 SERPL-SCNC: 25 MMOL/L (ref 20–29)
CREAT SERPL-MCNC: 0.64 MG/DL (ref 0.57–1)
EGFRCR SERPLBLD CKD-EPI 2021: 120 ML/MIN/1.73
EOSINOPHIL # BLD AUTO: 0.4 X10E3/UL (ref 0–0.4)
EOSINOPHIL NFR BLD AUTO: 7 %
ERYTHROCYTE [DISTWIDTH] IN BLOOD BY AUTOMATED COUNT: 11.8 % (ref 11.7–15.4)
FSH SERPL-ACNC: 7.5 MIU/ML
GLOBULIN SER CALC-MCNC: 2.9 G/DL (ref 1.5–4.5)
GLUCOSE SERPL-MCNC: 81 MG/DL (ref 70–99)
HAV IGM SERPL QL IA: NEGATIVE
HBA1C MFR BLD: 5 % (ref 4.8–5.6)
HBV CORE IGM SERPL QL IA: NEGATIVE
HBV SURFACE AG SERPL QL IA: NEGATIVE
HCT VFR BLD AUTO: 40 % (ref 34–46.6)
HCV AB SERPL QL IA: NORMAL
HCV IGG SERPL QL IA: NON REACTIVE
HDLC SERPL-MCNC: 69 MG/DL
HGB BLD-MCNC: 12.7 G/DL (ref 11.1–15.9)
HIV 1+2 AB+HIV1 P24 AG SERPL QL IA: NON REACTIVE
IMM GRANULOCYTES # BLD AUTO: 0 X10E3/UL (ref 0–0.1)
IMM GRANULOCYTES NFR BLD AUTO: 0 %
IRON SATN MFR SERPL: 23 % (ref 15–55)
IRON SERPL-MCNC: 85 UG/DL (ref 27–159)
LDLC SERPL CALC-MCNC: 144 MG/DL (ref 0–99)
LH SERPL-ACNC: 10.8 MIU/ML
LYMPHOCYTES # BLD AUTO: 2.4 X10E3/UL (ref 0.7–3.1)
LYMPHOCYTES NFR BLD AUTO: 42 %
MCH RBC QN AUTO: 30.7 PG (ref 26.6–33)
MCHC RBC AUTO-ENTMCNC: 31.8 G/DL (ref 31.5–35.7)
MCV RBC AUTO: 97 FL (ref 79–97)
MONOCYTES # BLD AUTO: 0.4 X10E3/UL (ref 0.1–0.9)
MONOCYTES NFR BLD AUTO: 7 %
NEUTROPHILS # BLD AUTO: 2.5 X10E3/UL (ref 1.4–7)
NEUTROPHILS NFR BLD AUTO: 43 %
PLATELET # BLD AUTO: 369 X10E3/UL (ref 150–450)
POTASSIUM SERPL-SCNC: 4.4 MMOL/L (ref 3.5–5.2)
PROLACTIN SERPL-MCNC: 15.4 NG/ML (ref 4.8–33.4)
PROT SERPL-MCNC: 7.1 G/DL (ref 6–8.5)
RBC # BLD AUTO: 4.14 X10E6/UL (ref 3.77–5.28)
RPR SER QL: NON REACTIVE
SODIUM SERPL-SCNC: 142 MMOL/L (ref 134–144)
TIBC SERPL-MCNC: 373 UG/DL (ref 250–450)
TRIGL SERPL-MCNC: 87 MG/DL (ref 0–149)
TSH SERPL DL<=0.005 MIU/L-ACNC: 1.13 UIU/ML (ref 0.45–4.5)
UIBC SERPL-MCNC: 288 UG/DL (ref 131–425)
VLDLC SERPL CALC-MCNC: 15 MG/DL (ref 5–40)
WBC # BLD AUTO: 5.8 X10E3/UL (ref 3.4–10.8)

## 2024-10-09 LAB
C TRACH RRNA SPEC QL NAA+PROBE: NEGATIVE
N GONORRHOEA RRNA SPEC QL NAA+PROBE: NEGATIVE
T VAGINALIS RRNA SPEC QL NAA+PROBE: NEGATIVE

## 2024-10-11 ENCOUNTER — HOSPITAL ENCOUNTER (OUTPATIENT)
Facility: HOSPITAL | Age: 34
Discharge: HOME | End: 2024-10-14
Payer: COMMERCIAL

## 2024-10-11 DIAGNOSIS — Z80.41 FAMILY HISTORY OF OVARIAN CANCER: ICD-10-CM

## 2024-10-11 DIAGNOSIS — N92.1 MENORRHAGIA WITH IRREGULAR CYCLE: ICD-10-CM

## 2024-10-11 LAB
TESTOST FREE SERPL-MCNC: 1.1 PG/ML (ref 0–4.2)
TESTOST SERPL-MCNC: 18 NG/DL (ref 8–60)

## 2024-10-11 PROCEDURE — 76830 TRANSVAGINAL US NON-OB: CPT

## 2024-10-22 LAB
BRCA1+BRCA2 MUT ANL BLD/T: NORMAL
CITATION REF LAB TEST: NORMAL
CLINICAL INFO: NORMAL
IMP & REVIEW OF LAB RESULTS: NORMAL
LAB DIRECTOR NAME PROVIDER: NORMAL
PREAUTHORIZATION: NORMAL
REASON FOR REFERRAL (NARRATIVE): NORMAL
RECOMMENDATION PATIENT DOC-IMP: NORMAL
REF LAB TEST METHOD: NORMAL
SPECIMEN SOURCE: NORMAL

## 2024-11-27 ENCOUNTER — OFFICE VISIT (OUTPATIENT)
Facility: CLINIC | Age: 34
End: 2024-11-27
Payer: COMMERCIAL

## 2024-11-27 VITALS
RESPIRATION RATE: 16 BRPM | OXYGEN SATURATION: 97 % | DIASTOLIC BLOOD PRESSURE: 82 MMHG | WEIGHT: 223 LBS | BODY MASS INDEX: 42.1 KG/M2 | SYSTOLIC BLOOD PRESSURE: 124 MMHG | HEIGHT: 61 IN | HEART RATE: 66 BPM | TEMPERATURE: 97.3 F

## 2024-11-27 DIAGNOSIS — N92.1 MENORRHAGIA WITH IRREGULAR CYCLE: Primary | ICD-10-CM

## 2024-11-27 DIAGNOSIS — R10.13 DYSPEPSIA: ICD-10-CM

## 2024-11-27 DIAGNOSIS — E66.01 MORBID OBESITY WITH BMI OF 40.0-44.9, ADULT: ICD-10-CM

## 2024-11-27 DIAGNOSIS — E55.9 VITAMIN D DEFICIENCY: ICD-10-CM

## 2024-11-27 PROCEDURE — 99214 OFFICE O/P EST MOD 30 MIN: CPT

## 2024-11-27 RX ORDER — CHOLECALCIFEROL (VITAMIN D3) 125 MCG
5000 CAPSULE ORAL DAILY
Qty: 90 CAPSULE | Refills: 3 | Status: SHIPPED | OUTPATIENT
Start: 2024-11-27

## 2024-11-27 RX ORDER — BUPROPION HYDROCHLORIDE 150 MG/1
150 TABLET ORAL EVERY MORNING
Qty: 30 TABLET | Refills: 3 | Status: SHIPPED | OUTPATIENT
Start: 2024-11-27

## 2024-11-27 NOTE — PROGRESS NOTES
Acute Office Visit    Subjective  Chief Complaint   Patient presents with    Menstrual Problem     Has them randomly-- flow is extremely heavy when on regular flow. Early October last random cycle     HPI:  Veronica Ayala is a 34 y.o. female. She presents for follow up on menorrhagia and irregular cycle as well as dyspepsia.     1. Menorrhagia with irregular cycle   Patient reports she is often having to use overnight pads in combination with tampons and is changing about 4 times daily.  Mom has a history of fibroids.  Patient denies any gynecological history of which she is aware.  She has nulliparous, no history of fibroids, ovarian cysts, or bleeding disorders that she is aware of.  Patient continues to have increasingly irregular cycles. Patient reports that aunts on her father's side have breast cancer, maternal grandmother had ovarian cancer. Recent labs and pelvic ultrasound don't confirm any pathology, endometrial strip WNL, no cysts or fibroids noted. Hormone panel didn't indicate ovarian dysfunction or insulin resistance.   2. Morbid obesity with BMI of 40.0-44.9, adult   Breakfast: typically eats a breakfast sandwich or pastry, drinks coffee  Lunch: Chicken salad or a sandwich  Dinner: Sometimes skips dinner or has a snack. Will eat chipotle bowl, or pizza  Snacking: Snacking throughout the day, has a sweet tooth  Alcohol: Limited. Has wine every couple of weeks  Sleep: Sleeping at least eight hours a night  Exercise: Walking and dancing to laura  Weight Loss Interventions: Has never done any medications in the past, not tracking calories  3. Dyspepsia  Patient reports she has kept a food diary and is noticing dyspepsia and bloating after consumption of bread. She has limited intake and is taking pepcid as needed.    Past Medical History:   Diagnosis Date    History of shingles     Obesity      History reviewed. No pertinent surgical history.         Current Outpatient Medications on File Prior to Visit

## 2024-11-27 NOTE — ASSESSMENT & PLAN NOTE
-Discussed daily water intake of at least eight 8 ounce cups daily  -Discussed exercising at least thirty minutes daily, even if broken up into five minute increments in the day  -Discussed stress reduction and getting adequate sleep at night  -Discussed diet high in lean proteins and vegetables  -Discussed benefit of tracking calories and maintaining a calorie deficit of 1200 calories daily  -Discussed limiting sugary processed foods and take out  -Discussed with her relationship between insulin resistance as well as menorrhagia, irregular menstrual cycle, and endometrial hyperplasias.   -Discussed benefits, risks, and side effects of prescribed medication

## 2024-11-27 NOTE — ASSESSMENT & PLAN NOTE
-Dyspepsia stable, continue current plan of care with dietary modifications and PRN use of H2 agonist.

## 2024-11-27 NOTE — ASSESSMENT & PLAN NOTE
-Discussed with patient risks, benefits, and side effects of birth control.  -Discussed different methods for starting birth control including Day 1 start, Sunday start, or beginning as soon as she fills it  -Discussed possible nausea and breast tenderness for the first month of starting new birth control  -Discussed possibility of break through bleeding and irregular menses for the first three months  -Reviewed S/S DVT and pulmonary embolism, discouraged tobacco use  -Advised birth control is not effective for pregnancy prevention for at least one week after starting     Last OV: 5/7/2024  Next OV: 6/18/2024    Next appointment due:around 6/18/2024     Last fill:5/7/24  Refills:0#90

## 2024-11-27 NOTE — PROGRESS NOTES
Chief Complaint   Patient presents with    Menstrual Problem     Has them randomly-- flow is extremely heavy when on regular flow. Early October last random cycle     No data recorded  \"Have you been to the ER, urgent care clinic since your last visit?  Hospitalized since your last visit?\"    NO    “Have you seen or consulted any other health care providers outside our system since your last visit?”    NO        Click Here for Release of Records Request   /82 (Site: Left Upper Arm, Position: Sitting, Cuff Size: Large Adult)   Pulse 66   Temp 97.3 °F (36.3 °C) (Temporal)   Resp 16   Ht 1.549 m (5' 1\")   Wt 101.2 kg (223 lb)   LMP 10/30/2024   SpO2 97%   BMI 42.14 kg/m²

## 2025-02-25 SDOH — ECONOMIC STABILITY: TRANSPORTATION INSECURITY
IN THE PAST 12 MONTHS, HAS LACK OF TRANSPORTATION KEPT YOU FROM MEETINGS, WORK, OR FROM GETTING THINGS NEEDED FOR DAILY LIVING?: PATIENT DECLINED

## 2025-02-25 SDOH — ECONOMIC STABILITY: TRANSPORTATION INSECURITY
IN THE PAST 12 MONTHS, HAS THE LACK OF TRANSPORTATION KEPT YOU FROM MEDICAL APPOINTMENTS OR FROM GETTING MEDICATIONS?: PATIENT DECLINED

## 2025-02-25 SDOH — ECONOMIC STABILITY: FOOD INSECURITY: WITHIN THE PAST 12 MONTHS, YOU WORRIED THAT YOUR FOOD WOULD RUN OUT BEFORE YOU GOT MONEY TO BUY MORE.: PATIENT DECLINED

## 2025-02-25 SDOH — ECONOMIC STABILITY: FOOD INSECURITY: WITHIN THE PAST 12 MONTHS, THE FOOD YOU BOUGHT JUST DIDN'T LAST AND YOU DIDN'T HAVE MONEY TO GET MORE.: PATIENT DECLINED

## 2025-02-25 SDOH — ECONOMIC STABILITY: INCOME INSECURITY: IN THE LAST 12 MONTHS, WAS THERE A TIME WHEN YOU WERE NOT ABLE TO PAY THE MORTGAGE OR RENT ON TIME?: PATIENT DECLINED

## 2025-02-27 ENCOUNTER — OFFICE VISIT (OUTPATIENT)
Facility: CLINIC | Age: 35
End: 2025-02-27
Payer: COMMERCIAL

## 2025-02-27 VITALS
HEIGHT: 61 IN | DIASTOLIC BLOOD PRESSURE: 86 MMHG | OXYGEN SATURATION: 97 % | RESPIRATION RATE: 18 BRPM | SYSTOLIC BLOOD PRESSURE: 128 MMHG | BODY MASS INDEX: 42.29 KG/M2 | HEART RATE: 84 BPM | WEIGHT: 224 LBS | TEMPERATURE: 97.3 F

## 2025-02-27 DIAGNOSIS — E78.00 ELEVATED LDL CHOLESTEROL LEVEL: ICD-10-CM

## 2025-02-27 DIAGNOSIS — E55.9 VITAMIN D DEFICIENCY: ICD-10-CM

## 2025-02-27 DIAGNOSIS — N92.1 MENORRHAGIA WITH IRREGULAR CYCLE: Primary | ICD-10-CM

## 2025-02-27 DIAGNOSIS — R10.13 DYSPEPSIA: ICD-10-CM

## 2025-02-27 DIAGNOSIS — E66.01 MORBID OBESITY WITH BMI OF 40.0-44.9, ADULT: ICD-10-CM

## 2025-02-27 PROCEDURE — 99214 OFFICE O/P EST MOD 30 MIN: CPT

## 2025-02-27 RX ORDER — SEMAGLUTIDE 0.25 MG/.5ML
0.25 INJECTION, SOLUTION SUBCUTANEOUS
Qty: 2 ML | Refills: 0 | Status: SHIPPED | OUTPATIENT
Start: 2025-02-27

## 2025-02-27 ASSESSMENT — PATIENT HEALTH QUESTIONNAIRE - PHQ9
1. LITTLE INTEREST OR PLEASURE IN DOING THINGS: SEVERAL DAYS
SUM OF ALL RESPONSES TO PHQ QUESTIONS 1-9: 2
SUM OF ALL RESPONSES TO PHQ QUESTIONS 1-9: 2
2. FEELING DOWN, DEPRESSED OR HOPELESS: SEVERAL DAYS
SUM OF ALL RESPONSES TO PHQ QUESTIONS 1-9: 2
SUM OF ALL RESPONSES TO PHQ QUESTIONS 1-9: 2
SUM OF ALL RESPONSES TO PHQ9 QUESTIONS 1 & 2: 2

## 2025-02-27 NOTE — PROGRESS NOTES
Chronic Condition Follow-Up    Subjective  Chief Complaint   Patient presents with    3 Month Follow-Up     Did not take medication we called in did different options and periods have been off as well as hormones.     HPI:  Veronica Ayala is a 34 y.o. female.    1. Menorrhagia with irregular cycle   Patient reports she is often having to use overnight pads in combination with tampons and is changing about 4 times daily.  Mom has a history of fibroids.  Patient denies any gynecological history of which she is aware.  She has nulliparous, no history of fibroids, ovarian cysts, or bleeding disorders that she is aware of.  Patient continues to have increasingly irregular cycles. Patient reports that aunts on her father's side have breast cancer, maternal grandmother had ovarian cancer. Recent labs and pelvic ultrasound don't confirm any pathology, endometrial strip WNL, no cysts or fibroids noted. Hormone panel didn't indicate ovarian dysfunction or insulin resistance.   Patient was sent Loloestrin, she reconsidered and decided she did not want to restart hormones. Patient reports she is getting menses once a month but she is getting spotting in between. Period is lasting three days and very heavy. She has a period March 24 with Dr. Lesa Germain with [facility].     2. Morbid obesity with BMI of 40.0-44.9, adult  Today's weight/BMI: 224 pounds, BMI 42.32, morbid obesity  Goal Weight/Trend: Unsure of goal weight, patient has gained nine pounds in the last six months  Weight Loss Interventions/History: Patient was prescribed Wellbutrin at last office visit, she had mood changes that caused her to stop taking it.   Exercise: Walking and dancing to [employer]  Diet: Breakfast: typically eats a breakfast sandwich or pastry, drinks coffee. Lunch: Chicken salad or a sandwich. Dinner: Sometimes skips dinner or has a snack. Will eat chipotle bowl, or pizza. Snacking: Snacking throughout the day, has a sweet tooth.

## 2025-02-27 NOTE — ASSESSMENT & PLAN NOTE
-Discussed with patient to discuss with OB/Gyn a menorrhagia panel/ruling out bleeding disorders.